# Patient Record
Sex: MALE | URBAN - METROPOLITAN AREA
[De-identification: names, ages, dates, MRNs, and addresses within clinical notes are randomized per-mention and may not be internally consistent; named-entity substitution may affect disease eponyms.]

---

## 2018-04-19 ENCOUNTER — HISTORICAL (OUTPATIENT)
Dept: LAB | Facility: HOSPITAL | Age: 68
End: 2018-04-19

## 2018-04-19 ENCOUNTER — HISTORICAL (OUTPATIENT)
Dept: PREADMISSION TESTING | Facility: HOSPITAL | Age: 68
End: 2018-04-19

## 2018-04-19 LAB
ABS NEUT (OLG): 5.64 X10(3)/MCL (ref 2.1–9.2)
ALBUMIN SERPL-MCNC: 3.8 GM/DL (ref 3.4–5)
ALBUMIN/GLOB SERPL: 1.2 {RATIO}
ALP SERPL-CCNC: 57 UNIT/L (ref 50–136)
ALT SERPL-CCNC: 28 UNIT/L (ref 12–78)
APTT PPP: 25.5 SECOND(S) (ref 24.8–36.9)
AST SERPL-CCNC: 19 UNIT/L (ref 15–37)
BASOPHILS # BLD AUTO: 0.1 X10(3)/MCL (ref 0–0.2)
BASOPHILS NFR BLD AUTO: 1 %
BILIRUB SERPL-MCNC: 0.3 MG/DL (ref 0.2–1)
BILIRUBIN DIRECT+TOT PNL SERPL-MCNC: 0.1 MG/DL (ref 0–0.2)
BILIRUBIN DIRECT+TOT PNL SERPL-MCNC: 0.2 MG/DL (ref 0–0.8)
BUN SERPL-MCNC: 21 MG/DL (ref 7–18)
CALCIUM SERPL-MCNC: 8.7 MG/DL (ref 8.5–10.1)
CHLORIDE SERPL-SCNC: 105 MMOL/L (ref 98–107)
CO2 SERPL-SCNC: 30 MMOL/L (ref 21–32)
CREAT SERPL-MCNC: 0.81 MG/DL (ref 0.7–1.3)
EOSINOPHIL # BLD AUTO: 0.2 X10(3)/MCL (ref 0–0.9)
EOSINOPHIL NFR BLD AUTO: 3 %
ERYTHROCYTE [DISTWIDTH] IN BLOOD BY AUTOMATED COUNT: 12.2 % (ref 11.5–17)
GLOBULIN SER-MCNC: 3.2 GM/DL (ref 2.4–3.5)
GLUCOSE SERPL-MCNC: 76 MG/DL (ref 74–106)
HCT VFR BLD AUTO: 45.9 % (ref 42–52)
HGB BLD-MCNC: 15.2 GM/DL (ref 14–18)
INR PPP: 0.9 (ref 0–1.27)
LYMPHOCYTES # BLD AUTO: 2.1 X10(3)/MCL (ref 0.6–4.6)
LYMPHOCYTES NFR BLD AUTO: 24 %
MCH RBC QN AUTO: 31.6 PG (ref 27–31)
MCHC RBC AUTO-ENTMCNC: 33.1 GM/DL (ref 33–36)
MCV RBC AUTO: 95.4 FL (ref 80–94)
MONOCYTES # BLD AUTO: 0.7 X10(3)/MCL (ref 0.1–1.3)
MONOCYTES NFR BLD AUTO: 8 %
NEUTROPHILS # BLD AUTO: 5.64 X10(3)/MCL (ref 1.4–7.9)
NEUTROPHILS NFR BLD AUTO: 65 %
PLATELET # BLD AUTO: 242 X10(3)/MCL (ref 130–400)
PMV BLD AUTO: 9.9 FL (ref 9.4–12.4)
POTASSIUM SERPL-SCNC: 4.6 MMOL/L (ref 3.5–5.1)
PROT SERPL-MCNC: 7 GM/DL (ref 6.4–8.2)
PROTHROMBIN TIME: 12.4 SECOND(S) (ref 12.2–14.7)
RBC # BLD AUTO: 4.81 X10(6)/MCL (ref 4.7–6.1)
SODIUM SERPL-SCNC: 141 MMOL/L (ref 136–145)
WBC # SPEC AUTO: 8.7 X10(3)/MCL (ref 4.5–11.5)

## 2018-04-20 ENCOUNTER — HISTORICAL (OUTPATIENT)
Dept: PREADMISSION TESTING | Facility: HOSPITAL | Age: 68
End: 2018-04-20

## 2018-04-23 ENCOUNTER — HISTORICAL (OUTPATIENT)
Dept: ADMINISTRATIVE | Facility: HOSPITAL | Age: 68
End: 2018-04-23

## 2018-04-30 ENCOUNTER — HISTORICAL (OUTPATIENT)
Dept: ADMINISTRATIVE | Facility: HOSPITAL | Age: 68
End: 2018-04-30

## 2022-04-30 NOTE — OP NOTE
DATE OF SURGERY:    04/30/2018    SURGEON:  Black Wahl MD    PREOPERATIVE DIAGNOSIS:  Melanoma in situ right temple.    POSTOPERATIVE DIAGNOSIS:  Melanoma in situ right temple.    PROCEDURE:  Closure of melanoma in situ defect.  Final defect measuring 6 X 2.5 cm.    ASSISTANT:  Tiana Goode RN    PROCEDURE IN DETAIL:  The patient was brought to the Operating Room and placed in supine position.  After achieving intravenous sedation, as well as local anesthesia with infiltration of 2% lidocaine with 1-1000 epinephrine, the face was prepped and draped for surgery.  With the use of #15 blade, the wounds were freshened by removing quadrants, marked for histologic evaluation and sent for permanent section.  The base of the defect was also freshened and sent for permanent section.  With the use of a #15 blade, an M-plasty was carried out relaxing skin tension lines.  Dissection was limited to the subcutaneous tissues and care was taken to avoid the region of the frontal branch of the facial nerve.  By primarily undermining the hair bearing portion of the scalp, with care being taken to stay deep to the follicles, hemostasis was achieved where necessary using bipolar cautery, then the wound was closed in layers using 5-0 PDS for the deep suture, as well as 5-0 Prolene in running locking fashion used to close the skin.  The M-plasty was advanced and closed with simple sutures.  The area was then cleaned with saline, dressed with Polysporin ointment and light pressure bandage was applied.  The patient tolerated the procedure well, was awakened in the Operating Room and brought to recovery in stable condition.        ______________________________  MD LELA Presley/CM  DD:  04/30/2018  Time:  08:45AM  DT:  04/30/2018  Time:  03:42PM  Job #:  [id number]

## 2022-04-30 NOTE — OP NOTE
DATE OF SURGERY:    04/23/2018    SURGEON:  Black Wahl MD    PREOPERATIVE DIAGNOSIS:  Melanoma in situ, right temple.    POSTOPERATIVE DIAGNOSIS:  Melanoma in situ, right temple.    PROCEDURE:  Wide excision of melanoma in situ measuring 2 x 2 cm.    ASSISTANT:  Tiana Goode RN    PROCEDURE IN DETAIL:  The patient was brought to the operating room and placed in supine position, after achieving local anesthesia, as well as intravenous sedation with 2% lidocaine with 1:100,000 epinephrine, the area involved was prepped and draped for procedure.  With a 15 blade, a circumferential incision was made and a previous melanoma in situ lesion, marked for histologic evaluation.  Hemostasis was achieved using bipolar cautery.  The wound was dressed with Polysporin ointment, was Telfa and light pressure bandage.  The patient tolerated the procedure well, was awakened in the operating room and brought to the recovery room in stable condition.        ______________________________  Black Wahl MD    JJJ/LENO  DD:  04/23/2018  Time:  09:33AM  DT:  04/23/2018  Time:  01:23PM  Job #:  [id number]